# Patient Record
Sex: FEMALE | Race: WHITE | NOT HISPANIC OR LATINO | Employment: FULL TIME | URBAN - METROPOLITAN AREA
[De-identification: names, ages, dates, MRNs, and addresses within clinical notes are randomized per-mention and may not be internally consistent; named-entity substitution may affect disease eponyms.]

---

## 2019-03-18 ENCOUNTER — HOSPITAL ENCOUNTER (INPATIENT)
Facility: HOSPITAL | Age: 24
LOS: 1 days | Discharge: LEFT BEFORE DISCHARGE | End: 2019-03-19
Attending: EMERGENCY MEDICINE | Admitting: INTERNAL MEDICINE

## 2019-03-18 DIAGNOSIS — R56.9 SEIZURES (HCC): Primary | ICD-10-CM

## 2019-03-18 LAB
BASOPHILS # BLD AUTO: 0.06 10*3/MM3 (ref 0–0.2)
BASOPHILS NFR BLD AUTO: 0.7 % (ref 0–1)
DEPRECATED RDW RBC AUTO: 39.2 FL (ref 37–54)
EOSINOPHIL # BLD AUTO: 0.1 10*3/MM3 (ref 0–0.3)
EOSINOPHIL NFR BLD AUTO: 1.2 % (ref 0–3)
ERYTHROCYTE [DISTWIDTH] IN BLOOD BY AUTOMATED COUNT: 12.4 % (ref 11.3–14.5)
GLUCOSE BLDC GLUCOMTR-MCNC: 100 MG/DL (ref 70–130)
HCT VFR BLD AUTO: 41.1 % (ref 34.5–44)
HGB BLD-MCNC: 14 G/DL (ref 11.5–15.5)
IMM GRANULOCYTES # BLD AUTO: 0.01 10*3/MM3 (ref 0–0.05)
IMM GRANULOCYTES NFR BLD AUTO: 0.1 % (ref 0–0.6)
LYMPHOCYTES # BLD AUTO: 2.34 10*3/MM3 (ref 0.6–4.8)
LYMPHOCYTES NFR BLD AUTO: 28 % (ref 24–44)
MCH RBC QN AUTO: 30 PG (ref 27–31)
MCHC RBC AUTO-ENTMCNC: 34.1 G/DL (ref 32–36)
MCV RBC AUTO: 88 FL (ref 80–99)
MONOCYTES # BLD AUTO: 0.8 10*3/MM3 (ref 0–1)
MONOCYTES NFR BLD AUTO: 9.6 % (ref 0–12)
NEUTROPHILS # BLD AUTO: 5.06 10*3/MM3 (ref 1.5–8.3)
NEUTROPHILS NFR BLD AUTO: 60.4 % (ref 41–71)
PLATELET # BLD AUTO: 285 10*3/MM3 (ref 150–450)
PMV BLD AUTO: 10.5 FL (ref 6–12)
RBC # BLD AUTO: 4.67 10*6/MM3 (ref 3.89–5.14)
WBC NRBC COR # BLD: 8.37 10*3/MM3 (ref 3.5–10.8)

## 2019-03-18 PROCEDURE — 80053 COMPREHEN METABOLIC PANEL: CPT | Performed by: EMERGENCY MEDICINE

## 2019-03-18 PROCEDURE — 85025 COMPLETE CBC W/AUTO DIFF WBC: CPT | Performed by: EMERGENCY MEDICINE

## 2019-03-18 PROCEDURE — 99291 CRITICAL CARE FIRST HOUR: CPT

## 2019-03-18 PROCEDURE — 80306 DRUG TEST PRSMV INSTRMNT: CPT | Performed by: EMERGENCY MEDICINE

## 2019-03-18 PROCEDURE — 87086 URINE CULTURE/COLONY COUNT: CPT | Performed by: INTERNAL MEDICINE

## 2019-03-18 PROCEDURE — 82550 ASSAY OF CK (CPK): CPT | Performed by: EMERGENCY MEDICINE

## 2019-03-18 PROCEDURE — 81025 URINE PREGNANCY TEST: CPT | Performed by: EMERGENCY MEDICINE

## 2019-03-18 PROCEDURE — 82962 GLUCOSE BLOOD TEST: CPT

## 2019-03-18 PROCEDURE — 25010000002 LORAZEPAM PER 2 MG: Performed by: EMERGENCY MEDICINE

## 2019-03-18 PROCEDURE — 81001 URINALYSIS AUTO W/SCOPE: CPT | Performed by: EMERGENCY MEDICINE

## 2019-03-18 PROCEDURE — 93005 ELECTROCARDIOGRAM TRACING: CPT | Performed by: EMERGENCY MEDICINE

## 2019-03-18 RX ORDER — LEVETIRACETAM 10 MG/ML
1000 INJECTION INTRAVASCULAR ONCE
Status: DISCONTINUED | OUTPATIENT
Start: 2019-03-18 | End: 2019-03-18

## 2019-03-18 RX ORDER — AMMONIA INHALANTS 0.04 G/.3ML
1 INHALANT RESPIRATORY (INHALATION) ONCE
Status: COMPLETED | OUTPATIENT
Start: 2019-03-18 | End: 2019-03-18

## 2019-03-18 RX ORDER — AMMONIA INHALANTS 0.04 G/.3ML
1 INHALANT RESPIRATORY (INHALATION) ONCE
Status: COMPLETED | OUTPATIENT
Start: 2019-03-18 | End: 2019-03-19

## 2019-03-18 RX ORDER — AMMONIA INHALANTS 0.04 G/.3ML
INHALANT RESPIRATORY (INHALATION)
Status: COMPLETED
Start: 2019-03-18 | End: 2019-03-18

## 2019-03-18 RX ORDER — LORAZEPAM 2 MG/ML
INJECTION INTRAMUSCULAR
Status: DISPENSED
Start: 2019-03-18 | End: 2019-03-19

## 2019-03-18 RX ORDER — LORAZEPAM 2 MG/ML
2 INJECTION INTRAMUSCULAR ONCE
Status: COMPLETED | OUTPATIENT
Start: 2019-03-18 | End: 2019-03-18

## 2019-03-18 RX ORDER — SODIUM CHLORIDE 0.9 % (FLUSH) 0.9 %
10 SYRINGE (ML) INJECTION AS NEEDED
Status: DISCONTINUED | OUTPATIENT
Start: 2019-03-18 | End: 2019-03-19 | Stop reason: HOSPADM

## 2019-03-18 RX ORDER — ONDANSETRON 2 MG/ML
4 INJECTION INTRAMUSCULAR; INTRAVENOUS ONCE
Status: COMPLETED | OUTPATIENT
Start: 2019-03-18 | End: 2019-03-19

## 2019-03-18 RX ADMIN — LORAZEPAM 2 MG: 2 INJECTION INTRAMUSCULAR; INTRAVENOUS at 22:10

## 2019-03-18 RX ADMIN — AMMONIA INHALANTS 1 AMPULE: 0.04 INHALANT RESPIRATORY (INHALATION) at 22:20

## 2019-03-18 RX ADMIN — SODIUM CHLORIDE 200 MG: 9 INJECTION, SOLUTION INTRAVENOUS at 23:13

## 2019-03-19 ENCOUNTER — APPOINTMENT (OUTPATIENT)
Dept: CT IMAGING | Facility: HOSPITAL | Age: 24
End: 2019-03-19

## 2019-03-19 ENCOUNTER — APPOINTMENT (OUTPATIENT)
Dept: NEUROLOGY | Facility: HOSPITAL | Age: 24
End: 2019-03-19

## 2019-03-19 VITALS
DIASTOLIC BLOOD PRESSURE: 68 MMHG | HEIGHT: 64 IN | BODY MASS INDEX: 24.24 KG/M2 | OXYGEN SATURATION: 96 % | TEMPERATURE: 98 F | WEIGHT: 142 LBS | SYSTOLIC BLOOD PRESSURE: 96 MMHG | HEART RATE: 84 BPM | RESPIRATION RATE: 20 BRPM

## 2019-03-19 PROBLEM — R56.9 SEIZURE-LIKE ACTIVITY (HCC): Status: ACTIVE | Noted: 2019-03-19

## 2019-03-19 PROBLEM — N30.00 ACUTE CYSTITIS WITHOUT HEMATURIA: Status: ACTIVE | Noted: 2019-03-19

## 2019-03-19 PROBLEM — R56.9 PSEUDOSEIZURES: Status: ACTIVE | Noted: 2019-03-19

## 2019-03-19 PROBLEM — G90.A POTS (POSTURAL ORTHOSTATIC TACHYCARDIA SYNDROME): Status: ACTIVE | Noted: 2019-03-19

## 2019-03-19 LAB
ALBUMIN SERPL-MCNC: 4.22 G/DL (ref 3.2–4.8)
ALBUMIN/GLOB SERPL: 2 G/DL (ref 1.5–2.5)
ALP SERPL-CCNC: 100 U/L (ref 25–100)
ALT SERPL W P-5'-P-CCNC: 14 U/L (ref 7–40)
AMPHET+METHAMPHET UR QL: NEGATIVE
AMPHETAMINES UR QL: NEGATIVE
ANION GAP SERPL CALCULATED.3IONS-SCNC: 10 MMOL/L (ref 3–11)
AST SERPL-CCNC: 20 U/L (ref 0–33)
B-HCG UR QL: NEGATIVE
BACTERIA UR QL AUTO: ABNORMAL /HPF
BARBITURATES UR QL SCN: NEGATIVE
BENZODIAZ UR QL SCN: POSITIVE
BILIRUB SERPL-MCNC: 0.5 MG/DL (ref 0.3–1.2)
BILIRUB UR QL STRIP: NEGATIVE
BUN BLD-MCNC: 12 MG/DL (ref 9–23)
BUN/CREAT SERPL: 16.9 (ref 7–25)
BUPRENORPHINE SERPL-MCNC: NEGATIVE NG/ML
CALCIUM SPEC-SCNC: 9 MG/DL (ref 8.7–10.4)
CANNABINOIDS SERPL QL: NEGATIVE
CHLORIDE SERPL-SCNC: 110 MMOL/L (ref 99–109)
CK SERPL-CCNC: 56 U/L (ref 26–174)
CLARITY UR: CLEAR
CO2 SERPL-SCNC: 22 MMOL/L (ref 20–31)
COCAINE UR QL: NEGATIVE
COLOR UR: YELLOW
CREAT BLD-MCNC: 0.71 MG/DL (ref 0.6–1.3)
GFR SERPL CREATININE-BSD FRML MDRD: 102 ML/MIN/1.73
GLOBULIN UR ELPH-MCNC: 2.1 GM/DL
GLUCOSE BLD-MCNC: 100 MG/DL (ref 70–100)
GLUCOSE UR STRIP-MCNC: NEGATIVE MG/DL
HGB UR QL STRIP.AUTO: NEGATIVE
HOLD SPECIMEN: NORMAL
HOLD SPECIMEN: NORMAL
HYALINE CASTS UR QL AUTO: ABNORMAL /LPF
KETONES UR QL STRIP: NEGATIVE
LEUKOCYTE ESTERASE UR QL STRIP.AUTO: ABNORMAL
METHADONE UR QL SCN: NEGATIVE
NITRITE UR QL STRIP: NEGATIVE
OPIATES UR QL: NEGATIVE
OXYCODONE UR QL SCN: NEGATIVE
PCP UR QL SCN: NEGATIVE
PH UR STRIP.AUTO: 7 [PH] (ref 5–8)
POTASSIUM BLD-SCNC: 4 MMOL/L (ref 3.5–5.5)
PROPOXYPH UR QL: NEGATIVE
PROT SERPL-MCNC: 6.3 G/DL (ref 5.7–8.2)
PROT UR QL STRIP: NEGATIVE
RBC # UR: ABNORMAL /HPF
REF LAB TEST METHOD: ABNORMAL
SODIUM BLD-SCNC: 142 MMOL/L (ref 132–146)
SP GR UR STRIP: 1.02 (ref 1–1.03)
SQUAMOUS #/AREA URNS HPF: ABNORMAL /HPF
TRICYCLICS UR QL SCN: NEGATIVE
UROBILINOGEN UR QL STRIP: ABNORMAL
WBC UR QL AUTO: ABNORMAL /HPF
WHOLE BLOOD HOLD SPECIMEN: NORMAL
WHOLE BLOOD HOLD SPECIMEN: NORMAL

## 2019-03-19 PROCEDURE — 25010000002 LORAZEPAM PER 2 MG: Performed by: INTERNAL MEDICINE

## 2019-03-19 PROCEDURE — 99291 CRITICAL CARE FIRST HOUR: CPT | Performed by: INTERNAL MEDICINE

## 2019-03-19 PROCEDURE — 99254 IP/OBS CNSLTJ NEW/EST MOD 60: CPT | Performed by: INTERNAL MEDICINE

## 2019-03-19 PROCEDURE — 70450 CT HEAD/BRAIN W/O DYE: CPT

## 2019-03-19 PROCEDURE — 25010000002 ONDANSETRON PER 1 MG: Performed by: NURSE PRACTITIONER

## 2019-03-19 PROCEDURE — 25010000002 ENOXAPARIN PER 10 MG: Performed by: INTERNAL MEDICINE

## 2019-03-19 PROCEDURE — 25010000002 ONDANSETRON PER 1 MG: Performed by: EMERGENCY MEDICINE

## 2019-03-19 PROCEDURE — 99254 IP/OBS CNSLTJ NEW/EST MOD 60: CPT | Performed by: PSYCHIATRY & NEUROLOGY

## 2019-03-19 PROCEDURE — 25010000002 CEFTRIAXONE PER 250 MG: Performed by: EMERGENCY MEDICINE

## 2019-03-19 PROCEDURE — 95813 EEG EXTND MNTR 61-119 MIN: CPT

## 2019-03-19 PROCEDURE — 95816 EEG AWAKE AND DROWSY: CPT

## 2019-03-19 RX ORDER — FERROUS SULFATE TAB EC 324 MG (65 MG FE EQUIVALENT) 324 (65 FE) MG
324 TABLET DELAYED RESPONSE ORAL
COMMUNITY

## 2019-03-19 RX ORDER — TOPIRAMATE 25 MG/1
50 CAPSULE, COATED PELLETS ORAL EVERY 12 HOURS SCHEDULED
Status: DISCONTINUED | OUTPATIENT
Start: 2019-03-19 | End: 2019-03-19

## 2019-03-19 RX ORDER — AMMONIA INHALANTS 0.04 G/.3ML
1 INHALANT RESPIRATORY (INHALATION) AS NEEDED
Status: DISCONTINUED | OUTPATIENT
Start: 2019-03-19 | End: 2019-03-19 | Stop reason: HOSPADM

## 2019-03-19 RX ORDER — ACETAMINOPHEN 325 MG/1
650 TABLET ORAL EVERY 6 HOURS PRN
Status: DISCONTINUED | OUTPATIENT
Start: 2019-03-19 | End: 2019-03-19 | Stop reason: HOSPADM

## 2019-03-19 RX ORDER — TOPIRAMATE 100 MG/1
200 TABLET, FILM COATED ORAL 2 TIMES DAILY
Status: ON HOLD | COMMUNITY
End: 2019-03-19

## 2019-03-19 RX ORDER — LANOLIN ALCOHOL/MO/W.PET/CERES
1000 CREAM (GRAM) TOPICAL DAILY
COMMUNITY

## 2019-03-19 RX ORDER — SERTRALINE HYDROCHLORIDE 25 MG/1
25 TABLET, FILM COATED ORAL DAILY
Status: ON HOLD | COMMUNITY
End: 2019-03-19

## 2019-03-19 RX ORDER — TOPIRAMATE 100 MG/1
200 TABLET, FILM COATED ORAL EVERY 12 HOURS SCHEDULED
Status: DISCONTINUED | OUTPATIENT
Start: 2019-03-19 | End: 2019-03-19 | Stop reason: HOSPADM

## 2019-03-19 RX ORDER — LORAZEPAM 2 MG/ML
INJECTION INTRAMUSCULAR
Status: COMPLETED
Start: 2019-03-19 | End: 2019-03-19

## 2019-03-19 RX ORDER — LORAZEPAM 2 MG/ML
0.5 INJECTION INTRAMUSCULAR EVERY 4 HOURS PRN
Status: DISCONTINUED | OUTPATIENT
Start: 2019-03-19 | End: 2019-03-19

## 2019-03-19 RX ORDER — CEFTRIAXONE SODIUM 1 G/50ML
1 INJECTION, SOLUTION INTRAVENOUS ONCE
Status: COMPLETED | OUTPATIENT
Start: 2019-03-19 | End: 2019-03-19

## 2019-03-19 RX ORDER — ONDANSETRON 2 MG/ML
4 INJECTION INTRAMUSCULAR; INTRAVENOUS EVERY 6 HOURS PRN
Status: DISCONTINUED | OUTPATIENT
Start: 2019-03-19 | End: 2019-03-19 | Stop reason: HOSPADM

## 2019-03-19 RX ADMIN — SODIUM CHLORIDE 1000 ML: 9 INJECTION, SOLUTION INTRAVENOUS at 00:11

## 2019-03-19 RX ADMIN — AMMONIA INHALANTS 1 EACH: 0.04 INHALANT RESPIRATORY (INHALATION) at 01:32

## 2019-03-19 RX ADMIN — SODIUM CHLORIDE 100 MG: 9 INJECTION, SOLUTION INTRAVENOUS at 08:27

## 2019-03-19 RX ADMIN — TOPIRAMATE 200 MG: 100 TABLET, FILM COATED ORAL at 08:26

## 2019-03-19 RX ADMIN — CEFTRIAXONE SODIUM 1 G: 1 INJECTION, SOLUTION INTRAVENOUS at 00:45

## 2019-03-19 RX ADMIN — ACETAMINOPHEN 650 MG: 325 TABLET, FILM COATED ORAL at 09:36

## 2019-03-19 RX ADMIN — SERTRALINE HYDROCHLORIDE 50 MG: 50 TABLET ORAL at 08:26

## 2019-03-19 RX ADMIN — ENOXAPARIN SODIUM 40 MG: 40 INJECTION SUBCUTANEOUS at 14:27

## 2019-03-19 RX ADMIN — ONDANSETRON 4 MG: 2 INJECTION INTRAMUSCULAR; INTRAVENOUS at 09:37

## 2019-03-19 RX ADMIN — ONDANSETRON 4 MG: 2 INJECTION INTRAMUSCULAR; INTRAVENOUS at 00:06

## 2019-03-19 RX ADMIN — LORAZEPAM 0.5 MG: 2 INJECTION INTRAMUSCULAR; INTRAVENOUS at 12:25

## 2019-03-19 NOTE — PROGRESS NOTES
"Patient is requesting discharge as she feels that she will have no further seizures.  She is requesting \"preventative\" therapy which in the past apparently has been IM Valium.    I recommended continued hospitalization at least so that we can monitor her more closely as she is new to our system and we have no established history of her issues.    They are still requesting discharge and as such will be AGAINST MEDICAL ADVICE.  I have offered to provide short-term prescriptions for her baseline medications but am not willing to prescribe any controlled substances.  They did not request prescriptions for any non-controlled substances.  "
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
## 2019-03-19 NOTE — NURSING NOTE
I, along with Security and the charge nurse spoke with patient, her significant other and patients mother in regards to service animal in the room, along with the reccomended treatment plan by the MD's on her case.  Patient's mother stated she did not feel like the patient needed continuous EEG for 72 hours, and that typically her seizures only occur every 4-8 weeks and for this reason they wanted to be discharged.  Patient's mother stated that patient's therapy dog detected seizure activity yesterday morning at 0700, and patient did not have a seizure until 2000 yesterday evening, that is when she was brought to the ED. Patient's mother stated [facility] had wanted to do a continuous EEG also and they declined and left AMA. Patient and mother are considering leaving AMA as they feel like the continuous EEG is not warranted.  Patient's mother asked if they left about prescriptions to help with seizures should she have one again, and her mother asked should the service animal detect a seizure or patient have a seizure what should they do.  I advised them they left AMA if to go to the closest ED or call 911. Dr. Malin and Dr. Rosales both notified of conversation with family.

## 2019-03-19 NOTE — NURSING NOTE
Dr. Rosales notified of patient and patients mother thoughts on leaving AMA.      Patient, Patient's mother and significant other notified that Dr. Rosales would write a prescription for her mainstence seizure medications but not for any controlled substances.  Patient's mother stated they had plenty of Topamax at home and did not need a script for that.

## 2019-03-19 NOTE — NURSING NOTE
Patient was told that her service dog was unable to stay while she was in the unit. After this, the patient decided that she wanted to leave. Her, along with her mother stated that they did not want the 72 hour EEG in place, that it would not show anything since it never does. Dr. Malin with neurology was notified that she was declining treatment. Dr. Rosales was also made aware of the situation. Details were explained to the patient and family. EEG, IV's, and monitoring was then discontinued. The patient signed the AMA paperwork and left at 1620.

## 2019-03-19 NOTE — NURSING NOTE
15:05  EEGT Yris and RADHA Bryant perform skin integrity check. No redness or skin irritation seen.
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
## 2019-03-19 NOTE — CONSULTS
[facility] at [facility]  Cardiology Consult Note  [facility] 2B ICU          Patient Identification:  Ebonie Chi      [id number]  23 y.o.        female  1995       Date of Consultation:  03/19/19    Reason for Consultation:  POTS    PCP: Khloe Keller APRN  Primary cardiologist: Drew Hemphill MD    History of Present Illness:     Ms. Chi is a 23-year-old female with past medical history of POTS, PTSD, seizure disorder who presented to the emergency department last evening with complaints of seizure onset 1 hour prior to arrival.  Per EMS she had a missed grand mal seizure approximately 2100 last evening.  Apparently she had a sustained seizure of approximately 6 minutes prior to her mother calling EMS.  Upon their arrival she was given IV Ativan and had additional seizure activity.      Of note she has a prior history of possible pseudoseizures at [facility] for which she was previously prescribed Keppra.  Keppra was subsequently discontinued secondary to side effect and she was started on topiramate.  She previously refused EEG. Of note she was admitted last summer and fall for seizures and required intubation.     Workup in ED notable for: Urine drug screen positive for prescribed benzodiazepines but no illicit substances, CT head without contrast unremarkable, EKG demonstrated sinus tachycardia, no other abnormalities.    Neurology was consulted and evaluated patient.  The recommendation was for 72-hour monitoring and cardiology consult for prior diagnosis of pots.  Their impression is that her spells are not epileptic in nature.    Regarding POTS, she was diagnosed in 2013 following some GI related issues and a negative GI workup. She was then sent to see a cardiologist at [facility] in [place]. She had a positive TTT. She was then tried on several substances including Florinef and Midodrine. She does not remember exactly what happened with each one but  states some made things worse, some better and some made her feel bad. Then she developed seizure activity and the shift was switched to that. The decision was made to just treat POTS conservatively with IVFs and increased sodium intake. She states that she still has syncopal episodes with a prodrome of LH, dizziness. She does better about catching it and sitting down before she has syncopal event. She reports drinking 100 ounces of water per day and 6 grams of sodium per day.     She also notes having pleuritic chest pain recently with hemoptysis. She was concerned for a PE but decided not to seek medical attention as she has previously been accused of trying to obtain pain meds. Hemoptysis and chest pain has since resolved.       Past History:  Past Medical History:   Diagnosis Date   • POTS (postural orthostatic tachycardia syndrome)    • PTSD (post-traumatic stress disorder)    • Seizure (CMS/HCC)      Past Surgical History:   Procedure Laterality Date   • BELPHAROPTOSIS REPAIR Left    • SHOULDER SURGERY Left    • TONSILLECTOMY       No Known Allergies  Social History     Socioeconomic History   • Marital status: Single     Spouse name: Not on file   • Number of children: Not on file   • Years of education: Not on file   • Highest education level: Not on file   Social Needs   • Financial resource strain: Not on file   • Food insecurity - worry: Not on file   • Food insecurity - inability: Not on file   • Transportation needs - medical: Not on file   • Transportation needs - non-medical: Not on file   Occupational History   • Not on file   Tobacco Use   • Smoking status: Never Smoker   Substance and Sexual Activity   • Alcohol use: Not on file   • Drug use: Not on file   • Sexual activity: Not on file   Other Topics Concern   • Not on file   Social History Narrative   • Not on file     History reviewed. No pertinent family history.  Medications:  Medications Prior to Admission   Medication Sig Dispense Refill  "Last Dose   • sertraline (ZOLOFT) 25 MG tablet Take 25 mg by mouth Daily.      • topiramate (TOPAMAX) 100 MG tablet Take 200 mg by mouth 2 (Two) Times a Day.        Current medications:    enoxaparin 40 mg Subcutaneous Q24H   sertraline 50 mg Oral Daily   topiramate 200 mg Oral Q12H     Current IV drips:       Review of Systems:    Constitutional no fever,  no weight loss   Skin no rash   Otolaryngeal no difficulty swallowing   Cardiovascular See HPI   Pulmonary no cough, no sputum production   Gastrointestinal no constipation, no diarrhea   Genitourinary no dysuria, no hematuria   Hematologic no easy bruisability, no abnormal bleeding   Musculoskeletal no muscle pain   Neurologic + dizziness, + falls     Physical exam:    BP 97/59   Pulse 82   Temp 98 °F (36.7 °C) (Oral)   Resp 20   Ht 162.6 cm (64\")   Wt 64.4 kg (142 lb)   SpO2 95%   BMI 24.37 kg/m²  Body mass index is 24.37 kg/m².   Oxygen saturation   SpO2  Min: 90 %  Max: 99 %    General Appearance:   · well developed  · well nourished  HENT:   · oropharynx moist  · lips not cyanotic  · Left eye lid abnormality.  Neck:  · thyroid not enlarged  · supple  Respiratory:  · no respiratory distress  · normal breath sounds  · no rales  Cardiovascular:  · no jugular venous distention  · regular rhythm  · apical impulse normal  · S1 normal, S2 normal  · no S3, no S4   · no murmur  · no rub, no thrill  · carotid pulses normal; no bruit  · pedal pulses normal  · lower extremity edema: none    Gastrointestinal:   · bowel sounds normal  · non-tender  · no hepatomegaly, no splenomegaly  Musculoskeletal:  · no clubbing of fingers.   · normocephalic, head atraumatic  Skin:   · warm, dry  Psychiatric:  · judgement and insight appropriate  · normal mood and affect    Cardiographics:     ECG  (personally reviewed) ST, 109 bpm   Telemetry:  (personally reviewed) SR   ECHO: n/a        CATH:  n/a   CARDIOLITE:  n/a    Lab Review:     Results from last 7 days   Lab Units " 03/18/19  2311   WBC 10*3/mm3 8.37   HEMOGLOBIN g/dL 14.0   HEMATOCRIT % 41.1          Results from last 7 days   Lab Units 03/18/19  2311   SODIUM mmol/L 142   BUN mg/dL 12   CREATININE mg/dL 0.71   GLUCOSE mg/dL 100      Estimated Creatinine Clearance: 125.3 mL/min (by C-G formula based on SCr of 0.71 mg/dL).   No results found for: CHOL, CHLPL, TRIG, HDL, LDL, LDLDIRECT        No results found for: TSH   No results found for: HGBA1C        No results found for: DIGOXIN   No results found for: DDIMERQUANT     Imaging:  All pertinent imaging studies were personally reviewed.    Assessment:      Seizure-like activity (CMS/HCC) - rule out seizures vs. non-epileptic seizures.      POTS (postural orthostatic tachycardia syndrome)    Rule out acute cystitis without hematuria - bacteriuria and mild pyuria.      Pseudoseizures      Initial cardiac assessment: Ms. Chi is a 23-year-old with past medical history of pots, PTSD, and possible seizures versus pseudoseizures.  She presented to [facility] ED this morning with complaints of persistent seizure activity.      Recommendations:  1. H/O POTS  - Reportedly diagnosed with + TTT in 2013 in [place]- data deficit  - Treated with several different meds including midodrine, florinef. Cannot recall the outcome of those. Now just managed with increased water and sodium intake.   - Last syncopal event about two months ago.   - Will obtain copy of outside records from cardiologist in [place].   - No other changes for now. Will continue current therapy with IVFs and sodium intake    2. Seizure-like activity/Spells  - neurology consulted and felt symptoms were non epileptic in nature  - 24 EEG ordered  -Topiramate continued    3. PTSD/Anxiety disorder  - On Zoloft      Thank you for allowing us to share in Ms. Chi's care.      Scribed for Drew Hemphill MD by LUIS Martínez, BRYSON. 3/19/2019  2:48 PM     IDrew M.D., personally performed the services  described in this documentation as scribed by the above named individual in my presence, and it is both accurate and complete.  03/19/19  5:34 PM
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
## 2019-03-19 NOTE — H&P
Pulmonary/Critical Care History and Physical Exam     LOS: 0 days   Patient Care Team:  Khloe Keller APRN as PCP - General (Nurse Practitioner)    Chief Complaint:    Chief Complaint   Patient presents with   • Seizures       Subjective     HPI: Ebonie Chi is a 23 y.o. female who reportedly had a grandmal seizure @ ~ 2100 that lasted longer then 20 minutes prior to EMS arrival.  Patient received 6 mg of ativan in total for epileptic appearing activity w/ an additional 2 mg given in ED.      The patient has a seizure alert service dog who has been alerting to seizures through out the day.  The patient's typical seizures begin w/ nausea/vomiting and today's was consistent with this.  Patient has been evaluated by  Neurology in 12/2018 and EEG testing was recommended, which patient refused.  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      The patient's mother reports that her spells were previously occurring at 4-5 week intervals, but prior to today, she had not had any spells for about 8 weeks.  She also reports that her daughter's spells were only lasting about 5 minutes in January, however lasted about 20 minutes at home today.   Her mother estimates that the patient has had about 7 or 8 spells today.      The patient had several spells in the ED that were terminated by using ammonia capsules.      The patient did ask my what our protocol was for intubating patients.  She wanted to know how long a seizure would have to be before a patient would need to be intubated.  Her mother reports that the patient has been intubated on 2 previous occasions.     The patient was previously seen by  neurology.  They had planned to admit her to the epilepsy monitoring unit however, they could not accommodate her service dog and so the patient declined the monitoring.    The patient denies any dysuria.    History taken from: patient    Past Medical History:   Diagnosis Date   •  POTS (postural orthostatic tachycardia syndrome)    • PTSD (post-traumatic stress disorder)    • Seizure (CMS/HCC)        Past Surgical History:   Procedure Laterality Date   • BELPHAROPTOSIS REPAIR Left    • SHOULDER SURGERY Left    • TONSILLECTOMY         History reviewed. No pertinent family history.    Social History     Socioeconomic History   • Marital status: Single     Spouse name: Not on file   • Number of children: Not on file   • Years of education: Not on file   • Highest education level: Not on file   Social Needs   • Financial resource strain: Not on file   • Food insecurity - worry: Not on file   • Food insecurity - inability: Not on file   • Transportation needs - medical: Not on file   • Transportation needs - non-medical: Not on file   Occupational History   • Not on file   Tobacco Use   • Smoking status: Never Smoker   Substance and Sexual Activity   • Alcohol use: Not on file   • Drug use: Not on file   • Sexual activity: Not on file   Other Topics Concern   • Not on file   Social History Narrative   • Not on file       No Known Allergies    PMH/FH/SocH were reviewed by me and updates were made.     Review of Systems:    All systems were reviewed and negative except as noted in subjective.  Cardiovascular: positive for  chest pressure / pain, at rest and palpitations  Gastrointestinal: positive for  nausea  Neurological: positive for  headaches and See HPI    Objective     Vital Signs  Temp:  [98.2 °F (36.8 °C)] 98.2 °F (36.8 °C)  Heart Rate:  [] 113  Resp:  [16] 16  BP: (101-118)/(63-75) 101/70    Physical Exam:     General Appearance:    Alert, cooperative, in no acute distress   Head:    Normocephalic, without obvious abnormality, atraumatic   Eyes:            Lids and lashes normal, conjunctivae and sclerae normal, no   icterus, no pallor, corneas clear, PER   ENMT:   Ears appear intact with no abnormalities noted      No oral lesions, no thrush, oral mucosa moist   Neck:   No  adenopathy, supple, trachea midline, no thyromegaly, no   carotid bruit, no JVD       Lungs/resp:     Normal effort, symmetric chest rise, no crepitus, clear to      auscultation bilaterally, no chest wall tenderness, resonant to percussion throughout.                  Heart/CV:    Regular rhythm, mildly tachycardic, normal S1 and S2, no            murmur   Abdomen/GI:     Normal bowel sounds, no masses, no organomegaly, soft        non-tender, non-distended   G/U:     Deferred   Extremities/MSK:   No clubbing, cyanosis or edema.  No deformities.    Pulses:   Pulses palpable and equal bilaterally   Skin:   No bleeding, bruising or rash   Hem/Lymph:   No cervical or supraclavicular adenopathy.    Neurologic:   Moves all extremities with no obvious focal motor deficit.  Cranial nerves 2 - 12 grossly intact            Psychiatric:  Normal mood and affect, oriented x 3.      Results Review:     I reviewed the patient's new clinical results.   Results from last 7 days   Lab Units 03/18/19  2311   SODIUM mmol/L 142   POTASSIUM mmol/L 4.0   CHLORIDE mmol/L 110*   CO2 mmol/L 22.0   BUN mg/dL 12   CREATININE mg/dL 0.71   CALCIUM mg/dL 9.0   BILIRUBIN mg/dL 0.5   ALK PHOS U/L 100   ALT (SGPT) U/L 14   AST (SGOT) U/L 20   GLUCOSE mg/dL 100     Results from last 7 days   Lab Units 03/18/19  2311   WBC 10*3/mm3 8.37   HEMOGLOBIN g/dL 14.0   HEMATOCRIT % 41.1   PLATELETS 10*3/mm3 285           I reviewed the patient's new imaging including images and reports.  CT head 3/19/19:  IMPRESSION:  No evidence for acute transcortical infarct, acute intracranial hemorrhage, or   mass effect.     Medication Review:     lacosamide (VIMPAT) IVPB 100 mg Intravenous Q12H   sertraline 50 mg Oral Daily   topiramate 200 mg Oral Q12H          Assessment/Plan       Seizure-like activity (CMS/HCC) - rule out seizures vs. non-epileptic seizures.      POTS (postural orthostatic tachycardia syndrome)    Rule out acute cystitis without hematuria -  "bacteriuria and mild pyuria.      23-year-old female with history of \"spells\" treated with antiepileptic medications in the past but felt previously by her neurologist in [place] as well as at [facility] to be more consistent with nonepileptic seizures is being admitted with recurrent spells.  The spells have been terminated by using ammonia inhalers with no definite postictal state.  Due to her recurrent spells however and the fact that she has been intubated on 2 occasions in the past, it was felt she should be monitored in the intensive care unit.    Plan:  - ICU  - continue Topimax  - Vimpat per Neurology recommendation  - 24 hr video EEG   - seizure precautions  - NH4 inhaler for seizurelike activity  - Check urine culture  - Patient is full code.    Aric Parham MD  03/19/19  3:04 AM  I performed an independent history and physical examination. Portions of the history were obtained by APRN and were modified by me according to my findings. The above note reflects my findings, assessment, and plan.    Aric Parham MD    Critical care time : 45 minutes.(This excludes time spent performing separately reportable procedures and services). including high complexity decision making to assess, manipulate, and support vital organ system failure in this individual who has impairment of one or more vital organ systems such that there is a high probability of imminent or life threatening deterioration in the patient’s condition.              "

## 2019-03-19 NOTE — NURSING NOTE
09:06  EEGT Yris and ED/RN Judith perform skin integrity check. No redness or skin irritation seen. Pt has no c/o discomfort from EEG procedure. Pt was encouraged to notify nursing staff of any discomfort resulting from EEG procedure.  12:09  EEGT Yris and RADHA Bryant perform skin integrity check. No irritation or redness was seen. Pt has no c/o any discomfort from EEG procedure.

## 2019-03-21 LAB — BACTERIA SPEC AEROBE CULT: NORMAL

## 2019-03-26 NOTE — DISCHARGE SUMMARY
DISCHARGE SUMMARY     Admit date: 3/18/2019  Date of Discharge:  3/26/2019    Discharge Diagnoses  Active Hospital Problems    Diagnosis   • **Seizure-like activity (CMS/HCC) - rule out seizures vs. non-epileptic seizures.     • POTS (postural orthostatic tachycardia syndrome)   • Rule out acute cystitis without hematuria - bacteriuria and mild pyuria.     • Pseudoseizures       Past Surgical History:   Procedure Laterality Date   • BELPHAROPTOSIS REPAIR Left    • SHOULDER SURGERY Left    • TONSILLECTOMY         History of Present Illness    Patient is a 23 y.o. female presented with: Seizure-like activity (CMS/HCC)    Per Admission HPI: Ebonie Chi is a 23 y.o. female who reportedly had a grandmal seizure @ ~ 2100 that lasted longer then 20 minutes prior to EMS arrival.  Patient received 6 mg of ativan in total for epileptic appearing activity w/ an additional 2 mg given in ED.       The patient has a seizure alert service dog who has been alerting to seizures through out the day.  The patient's typical seizures begin w/ nausea/vomiting and today's was consistent with this.  Patient has been evaluated by [facility] in 12/2018 and EEG testing was recommended, which patient refused.  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       The patient's mother reports that her spells were previously occurring at 4-5 week intervals, but prior to today, she had not had any spells for about 8 weeks.  She also reports that her daughter's spells were only lasting about 5 minutes in January, however lasted about 20 minutes at home today.   Her mother estimates that the patient has had about 7 or 8 spells today.       The patient had several spells in the ED that were terminated by using ammonia capsules.       The patient did ask my what our protocol was for intubating patients.  She wanted to know how long a seizure would have to be before a patient would need to be intubated.  Her  mother reports that the patient has been intubated on 2 previous occasions.      The patient was previously seen by  neurology.  They had planned to admit her to the epilepsy monitoring unit however, they could not accommodate her service dog and so the patient declined the monitoring.     The patient denies any dysuria.     History taken from: patient      Hospital Course    Patient was admitted to the intensive care unit.  She was monitored with continuous EEG.  She was seen in consultation by Dr. Malin, neurology.    She had no electrical evidence of seizure activity.  She was clinically neurologically stable.    Later in the day after admission, she requested discharge as she felt certain she was can have no further seizures.    Having just been admitted, and new to our service, we were not as confident that her medical status was stable and recommended continued hospitalization.    She ultimately chose to leave the hospital AGAINST MEDICAL ADVICE.    I did offer to provide short-term prescriptions for maintenance medicines.  However, she only requested IM Valium to prevent seizures.  I did not feel comfortable with this, in addition I do not think I am Valium is even available from the supplier, and therefore refused.    She was informed of potential risks of leaving hospital AGAINST MEDICAL ADVICE, including rehospitalization, intractable seizures, and even death    Procedures Performed:  EEG    Consults: Neurology    Condition on Discharge:  Stable    Discharge Disposition: Left Against Medical Advice    Discharge Medications:      Discharge Medications      ASK your doctor about these medications      Instructions Start Date   ferrous sulfate 324 (65 Fe) MG tablet delayed-release EC tablet   324 mg, Oral, Daily With Breakfast      MULTIVITAMIN ADULT PO   1 tablet, Oral, Daily      HAIR SKIN AND NAILS FORMULA PO   1 tablet, Oral, Daily      sertraline 50 MG tablet  Commonly known as:  ZOLOFT   50 mg,  Oral, Daily      TROKENDI  MG capsule sustained-release 24 hr  Generic drug:  Topiramate ER   1 capsule, Oral, 2 Times Daily      vitamin B-12 1000 MCG tablet  Commonly known as:  CYANOCOBALAMIN   1,000 mcg, Oral, Daily             Discharge Diet: No diet orders on file     Code Status and Medical Interventions:   Ordered at: 03/19/19 1405     Code Status:    CPR     Medical Interventions (Level of Support Prior to Arrest):    Full       Naveed Rosales MD  03/26/19  1:18 PM

## 2019-07-21 ENCOUNTER — HOSPITAL ENCOUNTER (OUTPATIENT)
Facility: HOSPITAL | Age: 24
Setting detail: OBSERVATION
Discharge: HOME | End: 2019-07-21
Attending: EMERGENCY MEDICINE | Admitting: INTERNAL MEDICINE

## 2019-07-21 ENCOUNTER — APPOINTMENT (OUTPATIENT)
Dept: GENERAL RADIOLOGY | Facility: HOSPITAL | Age: 24
End: 2019-07-21

## 2019-07-21 VITALS
RESPIRATION RATE: 20 BRPM | WEIGHT: 135 LBS | HEART RATE: 89 BPM | OXYGEN SATURATION: 97 % | TEMPERATURE: 98.3 F | BODY MASS INDEX: 23.05 KG/M2 | DIASTOLIC BLOOD PRESSURE: 61 MMHG | HEIGHT: 64 IN | SYSTOLIC BLOOD PRESSURE: 100 MMHG

## 2019-07-21 DIAGNOSIS — R40.0 SOMNOLENCE: Primary | ICD-10-CM

## 2019-07-21 DIAGNOSIS — G40.909 SEIZURE DISORDER (HCC): ICD-10-CM

## 2019-07-21 PROBLEM — F43.10 PTSD (POST-TRAUMATIC STRESS DISORDER): Status: ACTIVE | Noted: 2019-07-21

## 2019-07-21 PROBLEM — R56.9 SEIZURE (HCC): Status: ACTIVE | Noted: 2019-07-21

## 2019-07-21 PROBLEM — G90.A POTS (POSTURAL ORTHOSTATIC TACHYCARDIA SYNDROME): Status: ACTIVE | Noted: 2019-07-21

## 2019-07-21 PROBLEM — F32.A DEPRESSION: Status: ACTIVE | Noted: 2019-07-21

## 2019-07-21 LAB
ALBUMIN SERPL-MCNC: 4.1 G/DL (ref 3.5–5.2)
ALBUMIN/GLOB SERPL: 1.6 G/DL
ALP SERPL-CCNC: 94 U/L (ref 39–117)
ALT SERPL W P-5'-P-CCNC: 11 U/L (ref 1–33)
AMPHET+METHAMPHET UR QL: NEGATIVE
AMPHETAMINES UR QL: NEGATIVE
ANION GAP SERPL CALCULATED.3IONS-SCNC: 11 MMOL/L (ref 5–15)
AST SERPL-CCNC: 17 U/L (ref 1–32)
B-HCG UR QL: NEGATIVE
BACTERIA UR QL AUTO: NORMAL /HPF
BARBITURATES UR QL SCN: NEGATIVE
BASOPHILS # BLD AUTO: 0.07 10*3/MM3 (ref 0–0.2)
BASOPHILS NFR BLD AUTO: 0.8 % (ref 0–1.5)
BENZODIAZ UR QL SCN: POSITIVE
BILIRUB SERPL-MCNC: 0.3 MG/DL (ref 0.2–1.2)
BILIRUB UR QL STRIP: NEGATIVE
BUN BLD-MCNC: 12 MG/DL (ref 6–20)
BUN/CREAT SERPL: 17.6 (ref 7–25)
BUPRENORPHINE SERPL-MCNC: NEGATIVE NG/ML
CALCIUM SPEC-SCNC: 9 MG/DL (ref 8.6–10.5)
CANNABINOIDS SERPL QL: NEGATIVE
CHLORIDE SERPL-SCNC: 105 MMOL/L (ref 98–107)
CLARITY UR: CLEAR
CO2 SERPL-SCNC: 25 MMOL/L (ref 22–29)
COCAINE UR QL: NEGATIVE
COLOR UR: YELLOW
CREAT BLD-MCNC: 0.68 MG/DL (ref 0.57–1)
DEPRECATED RDW RBC AUTO: 39 FL (ref 37–54)
EOSINOPHIL # BLD AUTO: 0.07 10*3/MM3 (ref 0–0.4)
EOSINOPHIL NFR BLD AUTO: 0.8 % (ref 0.3–6.2)
ERYTHROCYTE [DISTWIDTH] IN BLOOD BY AUTOMATED COUNT: 12 % (ref 12.3–15.4)
ETHANOL BLD-MCNC: <10 MG/DL (ref 0–10)
GFR SERPL CREATININE-BSD FRML MDRD: 107 ML/MIN/1.73
GLOBULIN UR ELPH-MCNC: 2.5 GM/DL
GLUCOSE BLD-MCNC: 112 MG/DL (ref 65–99)
GLUCOSE UR STRIP-MCNC: NEGATIVE MG/DL
HCT VFR BLD AUTO: 38.3 % (ref 34–46.6)
HGB BLD-MCNC: 12.6 G/DL (ref 12–15.9)
HGB UR QL STRIP.AUTO: NEGATIVE
HYALINE CASTS UR QL AUTO: NORMAL /LPF
IMM GRANULOCYTES # BLD AUTO: 0.03 10*3/MM3 (ref 0–0.05)
IMM GRANULOCYTES NFR BLD AUTO: 0.3 % (ref 0–0.5)
KETONES UR QL STRIP: NEGATIVE
LEUKOCYTE ESTERASE UR QL STRIP.AUTO: ABNORMAL
LYMPHOCYTES # BLD AUTO: 2.07 10*3/MM3 (ref 0.7–3.1)
LYMPHOCYTES NFR BLD AUTO: 23.3 % (ref 19.6–45.3)
MCH RBC QN AUTO: 28.9 PG (ref 26.6–33)
MCHC RBC AUTO-ENTMCNC: 32.9 G/DL (ref 31.5–35.7)
MCV RBC AUTO: 87.8 FL (ref 79–97)
METHADONE UR QL SCN: NEGATIVE
MONOCYTES # BLD AUTO: 0.85 10*3/MM3 (ref 0.1–0.9)
MONOCYTES NFR BLD AUTO: 9.6 % (ref 5–12)
NEUTROPHILS # BLD AUTO: 5.81 10*3/MM3 (ref 1.7–7)
NEUTROPHILS NFR BLD AUTO: 65.2 % (ref 42.7–76)
NITRITE UR QL STRIP: NEGATIVE
NRBC BLD AUTO-RTO: 0 /100 WBC (ref 0–0.2)
OPIATES UR QL: NEGATIVE
OXYCODONE UR QL SCN: NEGATIVE
PCP UR QL SCN: NEGATIVE
PH UR STRIP.AUTO: 6.5 [PH] (ref 5–8)
PLATELET # BLD AUTO: 269 10*3/MM3 (ref 140–450)
PMV BLD AUTO: 10.7 FL (ref 6–12)
POTASSIUM BLD-SCNC: 3.5 MMOL/L (ref 3.5–5.2)
PROPOXYPH UR QL: NEGATIVE
PROT SERPL-MCNC: 6.6 G/DL (ref 6–8.5)
PROT UR QL STRIP: NEGATIVE
RBC # BLD AUTO: 4.36 10*6/MM3 (ref 3.77–5.28)
RBC # UR: NORMAL /HPF
REF LAB TEST METHOD: NORMAL
SODIUM BLD-SCNC: 141 MMOL/L (ref 136–145)
SP GR UR STRIP: 1.02 (ref 1–1.03)
SQUAMOUS #/AREA URNS HPF: NORMAL /HPF
TRICYCLICS UR QL SCN: NEGATIVE
UROBILINOGEN UR QL STRIP: ABNORMAL
WBC NRBC COR # BLD: 8.9 10*3/MM3 (ref 3.4–10.8)
WBC UR QL AUTO: NORMAL /HPF

## 2019-07-21 PROCEDURE — 81001 URINALYSIS AUTO W/SCOPE: CPT | Performed by: EMERGENCY MEDICINE

## 2019-07-21 PROCEDURE — G0378 HOSPITAL OBSERVATION PER HR: HCPCS

## 2019-07-21 PROCEDURE — 80307 DRUG TEST PRSMV CHEM ANLYZR: CPT | Performed by: EMERGENCY MEDICINE

## 2019-07-21 PROCEDURE — 71045 X-RAY EXAM CHEST 1 VIEW: CPT

## 2019-07-21 PROCEDURE — 85025 COMPLETE CBC W/AUTO DIFF WBC: CPT | Performed by: EMERGENCY MEDICINE

## 2019-07-21 PROCEDURE — 93005 ELECTROCARDIOGRAM TRACING: CPT | Performed by: EMERGENCY MEDICINE

## 2019-07-21 PROCEDURE — 80306 DRUG TEST PRSMV INSTRMNT: CPT | Performed by: EMERGENCY MEDICINE

## 2019-07-21 PROCEDURE — 99222 1ST HOSP IP/OBS MODERATE 55: CPT | Performed by: PSYCHIATRY & NEUROLOGY

## 2019-07-21 PROCEDURE — 99236 HOSP IP/OBS SAME DATE HI 85: CPT | Performed by: INTERNAL MEDICINE

## 2019-07-21 PROCEDURE — 99285 EMERGENCY DEPT VISIT HI MDM: CPT

## 2019-07-21 PROCEDURE — 81025 URINE PREGNANCY TEST: CPT | Performed by: EMERGENCY MEDICINE

## 2019-07-21 PROCEDURE — 80053 COMPREHEN METABOLIC PANEL: CPT | Performed by: EMERGENCY MEDICINE

## 2019-07-21 RX ORDER — TOPIRAMATE 100 MG/1
200 TABLET, FILM COATED ORAL EVERY 12 HOURS SCHEDULED
Status: DISCONTINUED | OUTPATIENT
Start: 2019-07-21 | End: 2019-07-21 | Stop reason: HOSPADM

## 2019-07-21 RX ORDER — DEXTROSE, SODIUM CHLORIDE, AND POTASSIUM CHLORIDE 5; .45; .15 G/100ML; G/100ML; G/100ML
100 INJECTION INTRAVENOUS CONTINUOUS
Status: DISCONTINUED | OUTPATIENT
Start: 2019-07-21 | End: 2019-07-21 | Stop reason: HOSPADM

## 2019-07-21 RX ORDER — LIDOCAINE HYDROCHLORIDE AND EPINEPHRINE 10; 10 MG/ML; UG/ML
10 INJECTION, SOLUTION INFILTRATION; PERINEURAL ONCE
Status: COMPLETED | OUTPATIENT
Start: 2019-07-21 | End: 2019-07-21

## 2019-07-21 RX ORDER — SODIUM CHLORIDE 0.9 % (FLUSH) 0.9 %
10 SYRINGE (ML) INJECTION AS NEEDED
Status: DISCONTINUED | OUTPATIENT
Start: 2019-07-21 | End: 2019-07-21 | Stop reason: HOSPADM

## 2019-07-21 RX ORDER — LORAZEPAM 2 MG/ML
INJECTION INTRAMUSCULAR
Status: DISCONTINUED
Start: 2019-07-21 | End: 2019-07-21 | Stop reason: HOSPADM

## 2019-07-21 RX ADMIN — SODIUM CHLORIDE 1000 ML: 9 INJECTION, SOLUTION INTRAVENOUS at 04:16

## 2019-07-21 RX ADMIN — LIDOCAINE HYDROCHLORIDE,EPINEPHRINE BITARTRATE 10 ML: 10; .01 INJECTION, SOLUTION INFILTRATION; PERINEURAL at 07:22

## 2019-07-29 ENCOUNTER — TELEPHONE (OUTPATIENT)
Dept: URGENT CARE | Facility: CLINIC | Age: 24
End: 2019-07-29

## 2019-07-29 NOTE — TELEPHONE ENCOUNTER
Patient arrives to [facility] for suture removal, sutures placed in [facility] ER 7/21/19. Well approximated healing incision noted on shin of left leg. Wound inspected by ANGELICA Liao DO. Sutures removed, site WNL. Patient DC home